# Patient Record
(demographics unavailable — no encounter records)

---

## 2025-06-25 NOTE — PHYSICAL EXAM
[Appropriately responsive] : appropriately responsive [Alert] : alert [No Acute Distress] : no acute distress [Soft] : soft [Non-tender] : non-tender [Non-distended] : non-distended [No HSM] : No HSM [No Mass] : no mass [Oriented x3] : oriented x3 [Examination Of The Breasts] : a normal appearance [No Discharge] : no discharge [No Masses] : no breast masses were palpable [No Lesions] : no lesions  [Labia Majora] : normal [Labia Minora] : normal [Pink Rugae] : pink rugae [Normal] : normal [Uterine Adnexae] : normal [Chaperone Declined] : Chaperone offered however refused by patient, [Tenderness] : nontender [Enlarged ___ wks] : not enlarged [FreeTextEntry5] : no CMT

## 2025-06-25 NOTE — HISTORY OF PRESENT ILLNESS
[FreeTextEntry1] : 36 y/o G0 presents for annual GYN exam.  In a new relationship of 2 months.  Using condoms consistently.  Stopped taking OCPs when started Zepbound.  Regular month periods.  Planning to freeze eggs in July.  Desires STD testing.  Reports overall good health  S/P Gardasil as a teen.  Denies FH of ca of ovary, colon, breast or uterus.  Mother  three years ago suddenly of Leukemia.  Moved back in with her father to save up money.  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 [PapSmeardate] : 4/20/22 [TextBox_31] : NILM/HPV-

## 2025-06-25 NOTE — HISTORY OF PRESENT ILLNESS
[FreeTextEntry1] : 34 y/o G0 presents for annual GYN exam.  In a new relationship of 2 months.  Using condoms consistently.  Stopped taking OCPs when started Zepbound.  Regular month periods.  Planning to freeze eggs in July.  Desires STD testing.  Reports overall good health  S/P Gardasil as a teen.  Denies FH of ca of ovary, colon, breast or uterus.  Mother  three years ago suddenly of Leukemia.  Moved back in with her father to save up money.  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 [PapSmeardate] : 4/20/22 [TextBox_31] : NILM/HPV-

## 2025-07-07 NOTE — PROCEDURE
[Colposcopy] : Colposcopy  [ASCUS] : ASCUS [HPV High Risk] : HPV high risk [Time out performed] : Pre-procedure time out performed.  Patient's name, date of birth and procedure confirmed. [Consent Obtained] : Consent obtained [Risks] : risks [Benefits] : benefits [Alternatives] : alternatives [Patient] : patient [Infection] : infection [Bleeding] : bleeding [Allergic Reaction] : allergic reaction [No Premedication] : no premedication [Colposcopy Adequate] : colposcopy adequate [Pap Performed] : pap not performed [SCI Fully Visualized] : SCI fully visualized [ECC Performed] : ECC performed [No Abnormalities] : no abnormalities [Biopsy] : biopsy taken [Hemostasis Obtained] : Hemostasis obtained [Tolerated Well] : the patient tolerated the procedure well [de-identified] : T-zone seen w/in ext cx os using a Q-tip [de-identified] : 3 [de-identified] : 6, 11 & 12:00 [de-identified] : Stella [de-identified] : Doubt CARISA Instructions given - avoid tampons, intercourse for 4-5 days.

## 2025-07-07 NOTE — PROCEDURE
[Colposcopy] : Colposcopy  [ASCUS] : ASCUS [HPV High Risk] : HPV high risk [Time out performed] : Pre-procedure time out performed.  Patient's name, date of birth and procedure confirmed. [Consent Obtained] : Consent obtained [Risks] : risks [Benefits] : benefits [Alternatives] : alternatives [Patient] : patient [Infection] : infection [Bleeding] : bleeding [Allergic Reaction] : allergic reaction [No Premedication] : no premedication [Colposcopy Adequate] : colposcopy adequate [Pap Performed] : pap not performed [SCI Fully Visualized] : SCI fully visualized [ECC Performed] : ECC performed [No Abnormalities] : no abnormalities [Biopsy] : biopsy taken [Hemostasis Obtained] : Hemostasis obtained [Tolerated Well] : the patient tolerated the procedure well [de-identified] : T-zone seen w/in ext cx os using a Q-tip [de-identified] : 3 [de-identified] : 6, 11 & 12:00 [de-identified] : Stella [de-identified] : Doubt CARISA Instructions given - avoid tampons, intercourse for 4-5 days.